# Patient Record
Sex: MALE | Race: BLACK OR AFRICAN AMERICAN | NOT HISPANIC OR LATINO | Employment: UNEMPLOYED | URBAN - METROPOLITAN AREA
[De-identification: names, ages, dates, MRNs, and addresses within clinical notes are randomized per-mention and may not be internally consistent; named-entity substitution may affect disease eponyms.]

---

## 2017-12-02 ENCOUNTER — HOSPITAL ENCOUNTER (EMERGENCY)
Facility: HOSPITAL | Age: 22
Discharge: HOME | End: 2017-12-02
Attending: EMERGENCY MEDICINE
Payer: MEDICAID

## 2017-12-02 VITALS
RESPIRATION RATE: 18 BRPM | BODY MASS INDEX: 21.67 KG/M2 | WEIGHT: 160 LBS | OXYGEN SATURATION: 98 % | SYSTOLIC BLOOD PRESSURE: 135 MMHG | DIASTOLIC BLOOD PRESSURE: 85 MMHG | HEIGHT: 72 IN | HEART RATE: 83 BPM | TEMPERATURE: 98 F

## 2017-12-02 DIAGNOSIS — R06.02 SHORTNESS OF BREATH: Primary | ICD-10-CM

## 2017-12-02 DIAGNOSIS — R42 DIZZINESS: ICD-10-CM

## 2017-12-02 LAB
ALBUMIN SERPL BCP-MCNC: 4.9 G/DL
ALP SERPL-CCNC: 77 U/L
ALT SERPL W/O P-5'-P-CCNC: 12 U/L
AMPHET+METHAMPHET UR QL: NEGATIVE
ANION GAP SERPL CALC-SCNC: 10 MMOL/L
AST SERPL-CCNC: 20 U/L
BACTERIA #/AREA URNS HPF: NORMAL /HPF
BARBITURATES UR QL SCN>200 NG/ML: NEGATIVE
BASOPHILS # BLD AUTO: 0.01 K/UL
BASOPHILS NFR BLD: 0.1 %
BENZODIAZ UR QL SCN>200 NG/ML: NEGATIVE
BILIRUB SERPL-MCNC: 0.9 MG/DL
BILIRUB UR QL STRIP: NEGATIVE
BUN SERPL-MCNC: 16 MG/DL
BZE UR QL SCN: NEGATIVE
CALCIUM SERPL-MCNC: 9.5 MG/DL
CANNABINOIDS UR QL SCN: NEGATIVE
CHLORIDE SERPL-SCNC: 103 MMOL/L
CK SERPL-CCNC: 169 U/L
CLARITY UR: CLEAR
CO2 SERPL-SCNC: 23 MMOL/L
COLOR UR: ABNORMAL
CREAT SERPL-MCNC: 1.2 MG/DL
CREAT UR-MCNC: 37.5 MG/DL
D DIMER PPP IA.FEU-MCNC: 0.66 MG/L FEU
DIFFERENTIAL METHOD: ABNORMAL
EOSINOPHIL # BLD AUTO: 0 K/UL
EOSINOPHIL NFR BLD: 0 %
ERYTHROCYTE [DISTWIDTH] IN BLOOD BY AUTOMATED COUNT: 13 %
EST. GFR  (AFRICAN AMERICAN): >60 ML/MIN/1.73 M^2
EST. GFR  (NON AFRICAN AMERICAN): >60 ML/MIN/1.73 M^2
GLUCOSE SERPL-MCNC: 90 MG/DL
GLUCOSE UR QL STRIP: NEGATIVE
HCT VFR BLD AUTO: 42.4 %
HGB BLD-MCNC: 14.7 G/DL
HGB UR QL STRIP: ABNORMAL
KETONES UR QL STRIP: NEGATIVE
LEUKOCYTE ESTERASE UR QL STRIP: NEGATIVE
LYMPHOCYTES # BLD AUTO: 0.9 K/UL
LYMPHOCYTES NFR BLD: 6.4 %
MCH RBC QN AUTO: 28.4 PG
MCHC RBC AUTO-ENTMCNC: 34.7 G/DL
MCV RBC AUTO: 82 FL
METHADONE UR QL SCN>300 NG/ML: NEGATIVE
MICROSCOPIC COMMENT: NORMAL
MONOCYTES # BLD AUTO: 0.6 K/UL
MONOCYTES NFR BLD: 4.1 %
NEUTROPHILS # BLD AUTO: 12.8 K/UL
NEUTROPHILS NFR BLD: 88.8 %
NITRITE UR QL STRIP: NEGATIVE
OPIATES UR QL SCN: NEGATIVE
PCP UR QL SCN>25 NG/ML: NEGATIVE
PH UR STRIP: 5 [PH] (ref 5–8)
PLATELET # BLD AUTO: 271 K/UL
PMV BLD AUTO: 11.9 FL
POTASSIUM SERPL-SCNC: 4.3 MMOL/L
PROT SERPL-MCNC: 8.2 G/DL
PROT UR QL STRIP: NEGATIVE
RBC # BLD AUTO: 5.17 M/UL
RBC #/AREA URNS HPF: 0 /HPF (ref 0–4)
SODIUM SERPL-SCNC: 136 MMOL/L
SP GR UR STRIP: 1 (ref 1–1.03)
TOXICOLOGY INFORMATION: NORMAL
TROPONIN I SERPL DL<=0.01 NG/ML-MCNC: 0.01 NG/ML
TSH SERPL DL<=0.005 MIU/L-ACNC: 0.65 UIU/ML
URN SPEC COLLECT METH UR: ABNORMAL
UROBILINOGEN UR STRIP-ACNC: NEGATIVE EU/DL
WBC # BLD AUTO: 14.44 K/UL
WBC #/AREA URNS HPF: 1 /HPF (ref 0–5)

## 2017-12-02 PROCEDURE — 85379 FIBRIN DEGRADATION QUANT: CPT

## 2017-12-02 PROCEDURE — 85025 COMPLETE CBC W/AUTO DIFF WBC: CPT

## 2017-12-02 PROCEDURE — 99284 EMERGENCY DEPT VISIT MOD MDM: CPT | Mod: 25

## 2017-12-02 PROCEDURE — 82550 ASSAY OF CK (CPK): CPT

## 2017-12-02 PROCEDURE — 93005 ELECTROCARDIOGRAM TRACING: CPT

## 2017-12-02 PROCEDURE — 81000 URINALYSIS NONAUTO W/SCOPE: CPT

## 2017-12-02 PROCEDURE — 84443 ASSAY THYROID STIM HORMONE: CPT

## 2017-12-02 PROCEDURE — 80053 COMPREHEN METABOLIC PANEL: CPT

## 2017-12-02 PROCEDURE — 96360 HYDRATION IV INFUSION INIT: CPT | Mod: 59

## 2017-12-02 PROCEDURE — 25000003 PHARM REV CODE 250: Performed by: NURSE PRACTITIONER

## 2017-12-02 PROCEDURE — 80307 DRUG TEST PRSMV CHEM ANLYZR: CPT

## 2017-12-02 PROCEDURE — 93010 ELECTROCARDIOGRAM REPORT: CPT | Mod: ,,, | Performed by: INTERNAL MEDICINE

## 2017-12-02 PROCEDURE — 25500020 PHARM REV CODE 255: Performed by: EMERGENCY MEDICINE

## 2017-12-02 PROCEDURE — 96361 HYDRATE IV INFUSION ADD-ON: CPT

## 2017-12-02 PROCEDURE — 84484 ASSAY OF TROPONIN QUANT: CPT

## 2017-12-02 RX ADMIN — IOHEXOL 70 ML: 350 INJECTION, SOLUTION INTRAVENOUS at 12:12

## 2017-12-02 RX ADMIN — SODIUM CHLORIDE 1000 ML: 0.9 INJECTION, SOLUTION INTRAVENOUS at 11:12

## 2017-12-02 NOTE — DISCHARGE INSTRUCTIONS
Please return to the Emergency Department for any new or worsening symptoms including: worsening or changes in your dizziness or shortness of breath, fever, chest pain, shortness of breath, loss of consciousness, dizziness, weakness, or any other concerns.     Please follow up with your Primary Care Provider on Monday. If you do not have one, you may contact the one listed on your discharge paperwork or you may also call the [facility] Appointment Desk at [phone] to schedule an appointment with one.     NO PT Until cleared by your PCP.     Please take all medication as prescribed.

## 2017-12-02 NOTE — ED TRIAGE NOTES
C/o feeling weak, dizzy & SOB after physical exercise ( drill). States it has happened before & they want him to get checked out. No complaints at this time (resting).

## 2017-12-02 NOTE — ED PROVIDER NOTES
"Encounter Date: 12/2/2017    SCRIBE #1 NOTE: I, Yazmin Maxwell, am scribing for, and in the presence of,  Curt York NP. I have scribed the following portions of the note - Other sections scribed: HPI, ROS.       History     Chief Complaint   Patient presents with    Dizziness     "after doing my drills, I felt weak and dizzy and short of breath. this is the 2nd time it happened so they are making me get checked out" currently not c/o of anything     CC: Dizziness    23 y/o male with no PMHx presents to the ED c/o acute onset dizziness "like i'm off balance" and SOB that started [place] [facility]ly 7:50[facility]. The patient states that his symptoms lasted a couple of minutes and resolved on its own. The patient reports similar symptoms 2 months during due to another physical exam. The patient is asymptomatic currently. The patient denies any recent long distance travel or taking any daily medications. The patient denies swelling to extremities, LOC, or syncope. No attempted treatment reported. No other symptoms reported.      The history is provided by the patient. No  was used.     Review of patient's allergies indicates:  No Known Allergies  History reviewed. No pertinent past medical history.  History reviewed. No pertinent surgical history.  History reviewed. No pertinent family history.  Social History   Substance Use Topics    Smoking status: Never Smoker    Smokeless tobacco: Never Used    Alcohol use No     Review of Systems   Constitutional: Negative for chills and fever.   HENT: Negative for rhinorrhea.    Eyes: Negative for redness.   Respiratory: Positive for shortness of breath. Negative for cough.    Cardiovascular: Negative for chest pain and leg swelling.   Gastrointestinal: Negative for abdominal pain, diarrhea, nausea and vomiting.   Genitourinary: Negative for difficulty urinating and dysuria.   Musculoskeletal: Negative " for back pain.        (-) arm or leg trouble   Skin: Negative for rash.   Neurological: Positive for dizziness. Negative for syncope and headaches.        (-) LOC       Physical Exam     Initial Vitals [12/02/17 0942]   BP Pulse Resp Temp SpO2   126/67 98 18 97.9 °F (36.6 °C) 98 %      MAP       86.67         Physical Exam    Nursing note and vitals reviewed.  Constitutional: Vital signs are normal. He appears well-developed and well-nourished. He is not diaphoretic. He is cooperative.  Non-toxic appearance. He does not have a sickly appearance. He does not appear ill. No distress.   HENT:   Head: Normocephalic and atraumatic.   Right Ear: Tympanic membrane and external ear normal.   Left Ear: Tympanic membrane and external ear normal.   Nose: Nose normal.   Mouth/Throat: Uvula is midline, oropharynx is clear and moist and mucous membranes are normal. No trismus in the jaw.   Eyes: Conjunctivae and EOM are normal.   Neck: Trachea normal, full passive range of motion without pain and phonation normal. Neck supple. No stridor present. No tracheal tenderness present. Carotid bruit is not present. Normal carotid pulses present.   Cardiovascular: Normal rate, regular rhythm, normal heart sounds, intact distal pulses and normal pulses. Exam reveals no S3, no S4 and no friction rub.    No murmur heard.  Pulses:       Carotid pulses are 2+ on the right side, and 2+ on the left side.       Radial pulses are 2+ on the right side, and 2+ on the left side.        Posterior tibial pulses are 2+ on the right side, and 2+ on the left side.   No lower extremity swelling or edema.   Pulmonary/Chest: Breath sounds normal. No tachypnea and no bradypnea. No respiratory distress. He has no wheezes. He has no rhonchi. He has no rales. He exhibits no tenderness.   Abdominal: Soft. There is no tenderness.   Musculoskeletal: Normal range of motion.   Neurological: He is alert and oriented to person, place, and time. He has normal strength.  No sensory deficit. Coordination and gait normal. GCS eye subscore is 4. GCS verbal subscore is 5. GCS motor subscore is 6.   Ambulates with a steady and even gait.  No ataxia.   Skin: Skin is warm and dry. Capillary refill takes less than 2 seconds. No bruising and no rash noted. No erythema.   Psychiatric: His speech is normal and behavior is normal. Judgment and thought content normal. His affect is blunt.         ED Course   Procedures  Labs Reviewed   CBC W/ AUTO DIFFERENTIAL - Abnormal; Notable for the following:        Result Value    WBC 14.44 (*)     Gran # 12.8 (*)     Lymph # 0.9 (*)     Gran% 88.8 (*)     Lymph% 6.4 (*)     All other components within normal limits   URINALYSIS - Abnormal; Notable for the following:     Occult Blood UA 2+ (*)     All other components within normal limits   D DIMER, QUANTITATIVE - Abnormal; Notable for the following:     D-Dimer 0.66 (*)     All other components within normal limits   COMPREHENSIVE METABOLIC PANEL   DRUG SCREEN PANEL, URINE EMERGENCY   TROPONIN I   TSH   CK   URINALYSIS MICROSCOPIC     EKG Readings: (Independently Interpreted)   Initial Reading: No STEMI. Rhythm: Normal Sinus Rhythm. Heart Rate: 97. Ectopy: No Ectopy. Conduction: Normal. Axis: Right Axis Deviation. Clinical Impression: Sinus Arrhythmia          Medical Decision Making:   Clinical Tests:   Lab Tests: Ordered and Reviewed  Radiological Study: Ordered and Reviewed  Medical Tests: Ordered and Reviewed    Additional MDM:   PERC Rule:   Age is greater than or equal to 50 = 0.0  Heart Rate is greater than or equal to 100 = 1.0  SaO2 on room air < 95% = 0.0  Unilateral leg swelling = 0.0  Hemoptysis = 0.0  Recent surgery or trauma = 0.0  Prior PE or DVT =  0.0  Hormone use = 0.00  PERC Score = 1    OSWALD Score:   Age over 65:                                    0.00   > or = to 3 CAD risk factors:           0.00  Established CAD:                            0.00  > or = to 2 anginal events in the  past 24 hours: 0.00  Use of ASA in past 7 days:              0.00  Elevated Enzymes:                         0.00  ST Depression > or = to 0.05 mV:  0.00  OSWALD score: 0    APC / Resident Notes:   This is an evaluation of a 22-year-old male that presents emergency read complaints of dizziness with shortness of breath that began while doing PT during  exercises. He reports a previous episode of this in October during similar circumstance. He reports his symptoms have resolved. Physical Exam shows a non-toxic, afebrile, and well appearing male.  Ears and throat without evidence of infection.  Breath sounds clear and equal auscultation.  Heart regular rhythm with no murmurs or rubs.  Abdomen soft and nontender.  He moves all extremities.  There is no lower extremity swelling or edema. He is neurologically intact. Vital Signs Are Reassuring. If available, previous records reviewed.  RESULTS: EKG sinus rhythm with sinus arrhythmia rate of 97.  There is a rightward axis with no evidence acute ischemia or arrhythmia. CBC with an elevation in white blood cells at 14.44.  No anemia.  Normal platelet count.  No bandemia.  Normal CMP.  Troponin 0.006. D-dimer  Elevated at 0.66. Normal CPK. UA 2+ occult blood with no infection. UDS WNL.  Normal TSH. Chest x-ray with no pneumonia, pneumothorax, or pleural effusion.  CTA with no definitive evidence for large or segment pulmonary embolism.  For evaluation of the smaller subsegmental pulmonary artery secondary to contrast and motion artifact.    My overall impression is SOB and Dizziness. I am unsure of the exact etiology of the patients symptoms at this time, however I do not suspect any acute, life threatening emergencies at this time. I considered, but at this time, do not suspect acute MI, cardiac arrhythmia,  pulmonary embolism, rhabdo, UTI, pyelonephritis, intra-abdominal infection, CHF, pneumonia, pneumothorax, pleural effusion, orthostatic hypotension, thyroid  storm.    ED Course: IV Fluids. Reassessment: Patient remains asymptomatic while in the emergency department. D/C Information: PCP follow-up on Monday for further evaluation.  No PT until cleared by PCP. The diagnosis, treatment plan, instructions for follow-up and reevaluation with PCP as well as ED return precautions were discussed and understanding was verbalized. All questions or concerns have been addressed. This case was discussed with Dr. Mendoza who is in agreement with my assessment and plan. LASHONDA Salter, MANADP-C        Scribe Attestation:   Scribe #1: I performed the above scribed service and the documentation accurately describes the services I performed. I attest to the accuracy of the note.    Attending Attestation:           Physician Attestation for Scribe:  Physician Attestation Statement for Scribe #1: I, Curt Torrez - MILANA, reviewed documentation, as scribed by Yazmin Maxwell in my presence, and it is both accurate and complete.                 ED Course      Clinical Impression:   The primary encounter diagnosis was Shortness of breath. A diagnosis of Dizziness was also pertinent to this visit.    Disposition:   Disposition: Discharged  Condition: Stable                        TRISTA Ortiz  12/02/17 5192

## 2021-11-30 ENCOUNTER — PATIENT MESSAGE (OUTPATIENT)
Dept: RESEARCH | Facility: HOSPITAL | Age: 26
End: 2021-11-30
Payer: MEDICAID